# Patient Record
Sex: MALE
[De-identification: names, ages, dates, MRNs, and addresses within clinical notes are randomized per-mention and may not be internally consistent; named-entity substitution may affect disease eponyms.]

---

## 2022-07-19 PROBLEM — M54.9 BACK PAIN: Status: ACTIVE | Noted: 2022-07-19

## 2022-07-19 PROBLEM — Z00.00 ENCOUNTER FOR PREVENTIVE HEALTH EXAMINATION: Status: ACTIVE | Noted: 2022-07-19

## 2022-07-20 ENCOUNTER — APPOINTMENT (OUTPATIENT)
Dept: ORTHOPEDIC SURGERY | Facility: CLINIC | Age: 27
End: 2022-07-20

## 2022-07-20 VITALS
BODY MASS INDEX: 29.83 KG/M2 | WEIGHT: 245 LBS | HEIGHT: 76 IN | HEART RATE: 93 BPM | SYSTOLIC BLOOD PRESSURE: 149 MMHG | DIASTOLIC BLOOD PRESSURE: 82 MMHG

## 2022-07-20 DIAGNOSIS — M54.9 DORSALGIA, UNSPECIFIED: ICD-10-CM

## 2022-07-20 PROCEDURE — 99204 OFFICE O/P NEW MOD 45 MIN: CPT

## 2022-07-20 PROCEDURE — 72100 X-RAY EXAM L-S SPINE 2/3 VWS: CPT

## 2022-07-20 RX ORDER — DICLOFENAC SODIUM 75 MG/1
75 TABLET, DELAYED RELEASE ORAL
Qty: 60 | Refills: 1 | Status: ACTIVE | COMMUNITY
Start: 2022-07-20 | End: 1900-01-01

## 2022-07-20 NOTE — HISTORY OF PRESENT ILLNESS
[Worsening] : worsening [de-identified] : Pt is a  26 year old male who presents to the office today for an initial evaluation of lower back pain, which started 6 months ago. \par Pain radiates down the buttocks to the left knee. \par Numbness and tingling in anterior left thigh.\par Sitting worsens the pain.  \par Takes Naproxen BID, which helps a little. \par He did 2 sessions of PT, but he feels it worsened the pain. \par Chriropractic care helped at first but then worsened. \par No prior JEWEL. \par No trauma or injury.  \par No prior imaging. \par No fever, chills, sweats, nausea/vomiting. No bowel or bladder dysfunction, no recent weight loss or gain. No night pain. This history is in addition to the intake form that I personally reviewed.\par

## 2022-07-20 NOTE — DISCUSSION/SUMMARY
[de-identified] : Transitional segment L5-S1.\par Left lumbar radiculopathy.\par Discussed all options. \par Diclofenac\par Lumbar HEP.\par Lumbar MRI referral.\par F/U 1-2 weeks.\par All options discussed including rest,medicine,home exercise, acupuncture, Chiropractic care, physical therapy, pain management and last resort surgery. All questions were answered, all alternatives discussed and the patient is in complete agreement with the plan. Follow up appointment as instructed. If any issues arise, the patient will call or come in sooner.\par

## 2022-07-20 NOTE — ADDENDUM
[FreeTextEntry1] : This note was written by Idris Camargo on 07/20/2022 acting as scribe for Dr. Steven Cantrell M.D.\par \par I, Steven Cantrell MD, have read and attest that all the information, medical decision making and discharge instructions within are true and accurate.

## 2022-07-20 NOTE — PHYSICAL EXAM
[Normal] : Gait: normal [Kim's Sign] : negative Kim's sign [Pronator Drift] : negative pronator drift [SLR] : negative straight leg raise [de-identified] : 5 out of 5 motor strength,sensation is intact and symmetrical full range of motion flexion extension and rotation, no palpatory tenderness full range of motion of hips knees shoulders and elbows (all four extremities), no atrophy, negative straight leg raise, no pathological reflexes, no swelling, normal ambulation, no apparent distress skin intact, no palpable lymph nodes, no upper or lower extremity instability, alert and oriented x 3 and normal mood. Normal finger-to nose test.\par Pain with lumbar flexion.\par  [de-identified] : XR AP Lat Lumbar 07/20/2022 - adequate but transitional segment L5-S1- reviewed with the patient.

## 2022-08-08 ENCOUNTER — APPOINTMENT (OUTPATIENT)
Dept: MRI IMAGING | Facility: CLINIC | Age: 27
End: 2022-08-08

## 2022-08-27 ENCOUNTER — OUTPATIENT (OUTPATIENT)
Dept: OUTPATIENT SERVICES | Facility: HOSPITAL | Age: 27
LOS: 1 days | End: 2022-08-27
Payer: COMMERCIAL

## 2022-08-27 ENCOUNTER — APPOINTMENT (OUTPATIENT)
Dept: MRI IMAGING | Facility: CLINIC | Age: 27
End: 2022-08-27

## 2022-08-27 DIAGNOSIS — Z00.00 ENCOUNTER FOR GENERAL ADULT MEDICAL EXAMINATION WITHOUT ABNORMAL FINDINGS: ICD-10-CM

## 2022-08-27 PROCEDURE — 72148 MRI LUMBAR SPINE W/O DYE: CPT

## 2022-08-27 PROCEDURE — 72148 MRI LUMBAR SPINE W/O DYE: CPT | Mod: 26

## 2022-09-02 ENCOUNTER — APPOINTMENT (OUTPATIENT)
Dept: ORTHOPEDIC SURGERY | Facility: CLINIC | Age: 27
End: 2022-09-02

## 2022-09-12 ENCOUNTER — APPOINTMENT (OUTPATIENT)
Dept: ORTHOPEDIC SURGERY | Facility: CLINIC | Age: 27
End: 2022-09-12

## 2022-09-12 VITALS
HEIGHT: 76 IN | DIASTOLIC BLOOD PRESSURE: 80 MMHG | SYSTOLIC BLOOD PRESSURE: 130 MMHG | WEIGHT: 245 LBS | BODY MASS INDEX: 29.83 KG/M2 | HEART RATE: 95 BPM

## 2022-09-12 DIAGNOSIS — M51.26 OTHER INTERVERTEBRAL DISC DISPLACEMENT, LUMBAR REGION: ICD-10-CM

## 2022-09-12 DIAGNOSIS — M54.16 RADICULOPATHY, LUMBAR REGION: ICD-10-CM

## 2022-09-12 PROCEDURE — 99214 OFFICE O/P EST MOD 30 MIN: CPT

## 2022-09-12 NOTE — ADDENDUM
[FreeTextEntry1] : This note was written by Idris Camargo on 09/12/2022 acting as scribe for Dr. Steven Cantrell M.D.\par \par I, Steven Cantrell MD, have read and attest that all the information, medical decision making and discharge instructions within are true and accurate.

## 2022-09-12 NOTE — DISCUSSION/SUMMARY
[Other: ____] : in [unfilled] [de-identified] : Transitional segment L5-S1.\par Mild right L5-S1 herniation.\par Right S1 lumbar radiculopathy.\par Discussed all options. \par Diclofenac\par Lumbar HEP.\par Referred to Dr. Tyson Leija pain management right SNRB S1\par Risks of surgery include infection, dural tear, nerve root injury, reherniation, future leg pain, future back pain, retained fragment, hematoma, urinary retention, worsening leg symptoms, foot drop, anesthetic risks, blood transfusion risks, positioning pain, visceral vascular injury, deep vein thrombosis, pulmonary embolus, and death. All risks were explained not exclusive to the ones mentioned alternatives were discussed and all questions were answered the patient agrees and understands the above and is in complete agreement with the plan. \par F/U 3 weeks.\par All options discussed including rest,medicine,home exercise, acupuncture, Chiropractic care, physical therapy, pain management and last resort surgery. All questions were answered, all alternatives discussed and the patient is in complete agreement with the plan. Follow up appointment as instructed. If any issues arise, the patient will call or come in sooner.\par

## 2022-09-12 NOTE — HISTORY OF PRESENT ILLNESS
[Worsening] : worsening [de-identified] : Pt is a  26 year old male who presents to the office today for a follow up evaluation of lower back pain. MRI REVIEW\par Pain radiates down the buttocks to the left knee. \par Numbness and tingling in anterior left thigh.\par Sitting worsens the pain.  \par Takes Naproxen BID, which helps a little. \par He did 2 sessions of PT, but he feels it worsened the pain. \par Chriropractic care helped at first but then worsened. \par No prior JEWEL. \par No trauma or injury.  \par No prior imaging. \par Here of an MRI review\par No fever, chills, sweats, nausea/vomiting. No bowel or bladder dysfunction, no recent weight loss or gain. No night pain. This history is in addition to the intake form that I personally reviewed.\par

## 2022-09-12 NOTE — PHYSICAL EXAM
[Normal] : Gait: normal [Kim's Sign] : negative Kim's sign [Pronator Drift] : negative pronator drift [SLR] : negative straight leg raise [de-identified] : 5 out of 5 motor strength,sensation is intact and symmetrical full range of motion flexion extension and rotation, no palpatory tenderness full range of motion of hips knees shoulders and elbows (all four extremities), no atrophy, negative straight leg raise, no pathological reflexes, no swelling, normal ambulation, no apparent distress skin intact, no palpable lymph nodes, no upper or lower extremity instability, alert and oriented x 3 and normal mood. Normal finger-to nose test.\par Pain with lumbar flexion.\par  [de-identified] : I reviewed, interpreted and clinically correlated the following outside imaging studies, \par MR SPINE LUMBAR - ORDERED BY: WINSTON HOLLOWAY\par \par \par PROCEDURE DATE: 08/27/2022\par \par \par \par INTERPRETATION: EXAMINATION: MRI lumbar spine without contrast\par \par CLINICAL INFORMATION: Low back pain and radiculopathy\par \par TECHNIQUE: Multiplanar, multisequential MR imaging was performed.\par \par FINDINGS: There is a transitional lumbosacral vertebral body which has a broad left transverse process that is fused to the left sacral ala. This vertebral body is termed S1 for the purposes of this report. Using this nomenclature, the conus terminates at the L1 level and is normal in signal. Vertebral body heights are maintained. Alignment is normal.\par \par T12-L1 through L4-L5: No bulging or herniated intervertebral discs. No spinal canal or foraminal narrowing.\par \par L5-S1: Moderate-sized right paracentral disc herniation which slightly posteriorly displaces the descending right S1 nerve root. This is superimposed on a minimal disc bulge. Mild right foraminal narrowing. No spinal canal narrowing.\par \par S1-S2: This is a transitional segment. No spinal canal or foraminal narrowing.\par \par There is no paraspinal muscle atrophy or edema.\par \par IMPRESSION: Transitional lumbosacral vertebral body which is termed S1 for the purposes of this report.\par Moderate-sized right paracentral disc herniation at L5-S1 which slightly posteriorly displaces the descending right S1 nerve root\par \par --- End of Report ---\par \par \par \par \par \par \par HUANG LYNN MD; Attending Radiologist\par This document has been electronically signed. Sep 1 2022 4:16PM\par \par \par \par XR AP Lat Lumbar 07/20/2022 - adequate but transitional segment L5-S1- reviewed with the patient.

## 2023-03-13 ENCOUNTER — APPOINTMENT (OUTPATIENT)
Dept: ORTHOPEDIC SURGERY | Facility: CLINIC | Age: 28
End: 2023-03-13

## 2025-06-17 ENCOUNTER — INPATIENT (INPATIENT)
Facility: HOSPITAL | Age: 30
LOS: 2 days | Discharge: HOME | End: 2025-06-20
Attending: HOSPITALIST | Admitting: HOSPITALIST
Payer: MEDICAID

## 2025-06-17 VITALS
SYSTOLIC BLOOD PRESSURE: 158 MMHG | RESPIRATION RATE: 15 BRPM | OXYGEN SATURATION: 100 % | TEMPERATURE: 98 F | HEART RATE: 109 BPM | DIASTOLIC BLOOD PRESSURE: 110 MMHG

## 2025-06-17 LAB
ALBUMIN SERPL ELPH-MCNC: 4.7 G/DL — SIGNIFICANT CHANGE UP (ref 3.3–5)
ALP SERPL-CCNC: 87 U/L — SIGNIFICANT CHANGE UP (ref 40–120)
ALT FLD-CCNC: 62 U/L — HIGH (ref 4–41)
ANION GAP SERPL CALC-SCNC: 17 MMOL/L — HIGH (ref 7–14)
APAP SERPL-MCNC: <10 UG/ML — LOW (ref 15–25)
AST SERPL-CCNC: 34 U/L — SIGNIFICANT CHANGE UP (ref 4–40)
BASOPHILS # BLD AUTO: 0.02 K/UL — SIGNIFICANT CHANGE UP (ref 0–0.2)
BASOPHILS NFR BLD AUTO: 0.3 % — SIGNIFICANT CHANGE UP (ref 0–2)
BILIRUB SERPL-MCNC: 0.4 MG/DL — SIGNIFICANT CHANGE UP (ref 0.2–1.2)
BUN SERPL-MCNC: 11 MG/DL — SIGNIFICANT CHANGE UP (ref 7–23)
CALCIUM SERPL-MCNC: 8.9 MG/DL — SIGNIFICANT CHANGE UP (ref 8.4–10.5)
CHLORIDE SERPL-SCNC: 104 MMOL/L — SIGNIFICANT CHANGE UP (ref 98–107)
CO2 SERPL-SCNC: 22 MMOL/L — SIGNIFICANT CHANGE UP (ref 22–31)
CREAT SERPL-MCNC: 0.97 MG/DL — SIGNIFICANT CHANGE UP (ref 0.5–1.3)
EGFR: 108 ML/MIN/1.73M2 — SIGNIFICANT CHANGE UP
EGFR: 108 ML/MIN/1.73M2 — SIGNIFICANT CHANGE UP
EOSINOPHIL # BLD AUTO: 0.04 K/UL — SIGNIFICANT CHANGE UP (ref 0–0.5)
EOSINOPHIL NFR BLD AUTO: 0.5 % — SIGNIFICANT CHANGE UP (ref 0–6)
ETHANOL SERPL-MCNC: 346 MG/DL — HIGH
GLUCOSE SERPL-MCNC: 97 MG/DL — SIGNIFICANT CHANGE UP (ref 70–99)
HCT VFR BLD CALC: 53.1 % — HIGH (ref 39–50)
HGB BLD-MCNC: 17.4 G/DL — HIGH (ref 13–17)
IMM GRANULOCYTES # BLD AUTO: 0.02 K/UL — SIGNIFICANT CHANGE UP (ref 0–0.07)
IMM GRANULOCYTES NFR BLD AUTO: 0.3 % — SIGNIFICANT CHANGE UP (ref 0–0.9)
LIDOCAIN IGE QN: 91 U/L — HIGH (ref 7–60)
LYMPHOCYTES # BLD AUTO: 3.87 K/UL — HIGH (ref 1–3.3)
LYMPHOCYTES NFR BLD AUTO: 51.1 % — HIGH (ref 13–44)
MAGNESIUM SERPL-MCNC: 2.1 MG/DL — SIGNIFICANT CHANGE UP (ref 1.6–2.6)
MCHC RBC-ENTMCNC: 26.6 PG — LOW (ref 27–34)
MCHC RBC-ENTMCNC: 32.8 G/DL — SIGNIFICANT CHANGE UP (ref 32–36)
MCV RBC AUTO: 81.1 FL — SIGNIFICANT CHANGE UP (ref 80–100)
MONOCYTES # BLD AUTO: 0.34 K/UL — SIGNIFICANT CHANGE UP (ref 0–0.9)
MONOCYTES NFR BLD AUTO: 4.5 % — SIGNIFICANT CHANGE UP (ref 2–14)
NEUTROPHILS # BLD AUTO: 3.28 K/UL — SIGNIFICANT CHANGE UP (ref 1.8–7.4)
NEUTROPHILS NFR BLD AUTO: 43.3 % — SIGNIFICANT CHANGE UP (ref 43–77)
NRBC # BLD AUTO: 0 K/UL — SIGNIFICANT CHANGE UP (ref 0–0)
NRBC # FLD: 0 K/UL — SIGNIFICANT CHANGE UP (ref 0–0)
NRBC BLD AUTO-RTO: 0 /100 WBCS — SIGNIFICANT CHANGE UP (ref 0–0)
PLATELET # BLD AUTO: 233 K/UL — SIGNIFICANT CHANGE UP (ref 150–400)
PMV BLD: 10.3 FL — SIGNIFICANT CHANGE UP (ref 7–13)
POTASSIUM SERPL-MCNC: 4.5 MMOL/L — SIGNIFICANT CHANGE UP (ref 3.5–5.3)
POTASSIUM SERPL-SCNC: 4.5 MMOL/L — SIGNIFICANT CHANGE UP (ref 3.5–5.3)
PROT SERPL-MCNC: 8.2 G/DL — SIGNIFICANT CHANGE UP (ref 6–8.3)
RBC # BLD: 6.55 M/UL — HIGH (ref 4.2–5.8)
RBC # FLD: 13.9 % — SIGNIFICANT CHANGE UP (ref 10.3–14.5)
SALICYLATES SERPL-MCNC: <0.3 MG/DL — LOW (ref 15–30)
SODIUM SERPL-SCNC: 143 MMOL/L — SIGNIFICANT CHANGE UP (ref 135–145)
TOXICOLOGY SCREEN, DRUGS OF ABUSE, SERUM RESULT: SIGNIFICANT CHANGE UP
WBC # BLD: 7.57 K/UL — SIGNIFICANT CHANGE UP (ref 3.8–10.5)
WBC # FLD AUTO: 7.57 K/UL — SIGNIFICANT CHANGE UP (ref 3.8–10.5)

## 2025-06-17 PROCEDURE — 99285 EMERGENCY DEPT VISIT HI MDM: CPT

## 2025-06-17 RX ADMIN — Medication 1000 MILLILITER(S): at 23:05

## 2025-06-17 RX ADMIN — Medication 20 MILLIGRAM(S): at 23:05

## 2025-06-17 NOTE — ED PROVIDER NOTE - CLINICAL SUMMARY MEDICAL DECISION MAKING FREE TEXT BOX
patient is a 29-year-old male, history of anxiety, depression managed on escitalopram, alcohol abuse brought in by dad for acute intoxication.  As per patient he has been drinking about 20 L of alcohol per week over the last year states that its coping mechanisms to deal with his anxiety and depression. On presentation patient with alcohol on breath, vitals stable, calm and cooperative, no tremors or acute signs of alcohol withdrawal.  Plan for routine labs throughout electrolyte derangements.  Plan for social work to establish possible detox.

## 2025-06-17 NOTE — ED ADULT TRIAGE NOTE - CHIEF COMPLAINT QUOTE
C/O intox endorsing drinking "15L of vodka" today. No complaints of chest pain, headache, nausea, dizziness, vomiting  SOB, fever, chills verbalized. no phx

## 2025-06-17 NOTE — ED PROVIDER NOTE - NSFOLLOWUPINSTRUCTIONS_ED_ALL_ED_FT
You were provided educational materials on guidelines and substance use and health. You were provided a referral to treatment for patient at [facility].   [address] [address]  [phone]    Your heart rate was also elevated. Please follow up with cardiology for further management.

## 2025-06-17 NOTE — ED PROVIDER NOTE - CARE PLAN
1 Principal Discharge DX:	Alcohol intoxication   Principal Discharge DX:	Alcohol dependence with withdrawal

## 2025-06-17 NOTE — ED PROVIDER NOTE - PROGRESS NOTE DETAILS
Aniyah Wahl DO (PGY-2): Patient signed out to me at shift change. Patient awake and alert, clinically sober. Father arrived to bring patient home, however -120s. No signs of alcohol withdrawal on reassessment. Will obtain ECG, give another liter of fluids and observe. Jaxson Chacon DO (ED Attending):     Patient was signed out to me pending sober reevaluation.    Patient blood work shows evidence of dehydration with hemoconcentration and RBC hemoglobin hematocrit.  No electrode abnormalities.  Mild elevation in lipase at 91.  Alcohol up at 346.    Patient currently clinically sober.  He is electrocardioverted at 110.  EKG obtained which shows sinus tachycardia 108 bpm, normal axis, normal intervals, T wave inversion seen in leads III and aVF.  No prior EKGs to compare to.    However per the patient he states that he knows that these T wave inversions.  Patient saw cardiologist back in February when she had a normal stress and echo.  They were "unable to find a cause".    He has no chest pain shortness of breath lightheadedness fatigue with no suspect ACS.    Will consult SBIRT Aniyah Wahl,  (PGY-2): HR improved after IVF. SBIRT evaluated at bedside and provided patient resources. Enrolled in [facility] ([address] [id number]). patient ambulatory in ED, stable for discharge home. Strict return precautions given. Aniyah Wahl DO (PGY-2): Patient's HR now intermittently between . CIWA protocol ordered, 50mg Librium given. Will admit for cardiac eval and alcohol withdrawal. Patient agreeable with plan.

## 2025-06-17 NOTE — ED PROVIDER NOTE - OBJECTIVE STATEMENT
patient is a 29-year-old male, history of anxiety, depression managed on escitalopram, alcohol abuse brought in by dad for acute intoxication.  As per patient he has been drinking about 20 L of alcohol per week over the last year states that its coping mechanisms to deal with his anxiety and depression.  There is no associated suicidal/homicidal ideation chest/abdominal pain, nausea, vomiting.  Denying any history of alcohol withdrawals, pancreatitis.  Patient states that he is interested in detox program.

## 2025-06-17 NOTE — ED ADULT NURSE NOTE - NSFALLRISKINTERV_ED_ALL_ED
Assistance OOB with selected safe patient handling equipment if applicable/Assistance with ambulation/Communicate fall risk and risk factors to all staff, patient, and family/Monitor gait and stability/Monitor for mental status changes and reorient to person, place, and time, as needed/Provide patient with walking aids/Provide visual cue: yellow wristband, yellow gown, etc/Reinforce activity limits and safety measures with patient and family/Toileting schedule using arm’s reach rule for commode and bathroom/Use of alarms - bed, stretcher, chair and/or video monitoring/Call bell, personal items and telephone in reach/Instruct patient to call for assistance before getting out of bed/chair/stretcher/Non-slip footwear applied when patient is off stretcher/[place] to call system/Physically safe environment - no spills, clutter or unnecessary equipment/Purposeful Proactive Rounding/Room/bathroom lighting operational, light cord in reach

## 2025-06-17 NOTE — ED PROVIDER NOTE - ATTENDING APP SHARED VISIT CONTRIBUTION OF CARE
Brief HPI:  29-year-old male, history of anxiety, depression managed on escitalopram, alcohol abuse with withdrawal (reports previous etoh hallucinosis in past) presents with father with concern for alcohol use.  Patient states he drank several liters of hard liquor today.  He is somewhat tangential, however states he came to the hospital to seek assistance with drinking problem.  Father corroborates this.  Denies other drug use.  Denies SI/HI/AH/VH.  Does not endorse other complaints.      Vitals:   Reviewed    Exam:    GEN:  Non-toxic appearing, non-distressed, speaking full sentences, slurred speech, non-diaphoretic, AAOx3  HEENT:  NCAT, neck supple, EOMI, PERRLA, sclera anicteric, no conjunctival pallor or injection, no stridor, normal voice, no tonsillar exudate, uvula midline  CV:  regular rhythm and rate, s1/s2 audible, no murmurs, rubs or gallops, peripheral pulses 2+ and symmetric  PULM:  non-labored respirations, lungs clear to auscultation bilaterally, no wheezes, crackles or rales  ABD:  non distended, non-tender, no rebound, no guarding, negative Thomas's sign, bowel sounds normal, no cvat  MSK:  no gross deformity, non-tender extremities and joints, range of motion grossly normal appearing, no extremity edema, extremities warm and well perfused   NEURO:  AAOx3, CN II-XII intact, motor 5/5 in upper and lower extremities bilaterally, sensation grossly intact in extremities and trunk, unable to assess gait at time of interview   SKIN:  warm, dry, no rash or vesicles     A/P:  29-year-old male, history of anxiety, depression managed on escitalopram, alcohol abuse with withdrawal (reports previous etoh hallucinosis in past) presents with father with concern for alcohol use.  VSS.  Exam intoxicated appearing, consistent with etoh use as patient stated.  Denies SI/HI/AH/VH.  Does not require emergent  consult at this time . Will provide supportive care and observe to sobriety.  Will contact social work regarding substance abuse resources.  Disposition pending.

## 2025-06-17 NOTE — ED ADULT NURSE NOTE - OBJECTIVE STATEMENT
Pt A&Ox4 ambulatory at baseline, PMH ETOH abuse, presenting to the ED (RM 24A) c/o intoxication. Pt brought in by father, stating patient has been drinking excessively for a week, states approx "15L of vodka". As per patient, states he has been going through stress. Endorses recently had a bone marrow biopsy for r/o Leukemia.. Pt at this moment denies any medical complaints. Denies chest pain, SOB, dizziness, lightheadedness, n/v/d, HA. blurry vision. Pt responsive to verbal stimulus, noted to be intoxicated. Respirations are even and unlabored, NAD, calm and cooperative. Safety precautions implemented as per protocol, awaiting further MD orders, plan of care ongoing. Pt A&Ox4 ambulatory at baseline, PMH ETOH abuse, presenting to the ED (RM 24A) c/o intoxication. Pt brought in by father, stating patient has been drinking excessively for a week, states approx "15L of vodka". As per patient, states he has been going through stress. Endorses recently had a bone marrow biopsy for r/o Leukemia. Last drink approx 2 hour ago. Pt at this moment denies any medical complaints. Denies chest pain, SOB, dizziness, lightheadedness, n/v/d, HA. blurry vision. Pt responsive to verbal stimulus, noted to be intoxicated. Respirations are even and unlabored, NAD, calm and cooperative. Safety precautions implemented as per protocol, awaiting further MD orders, plan of care ongoing.

## 2025-06-17 NOTE — ED PROVIDER NOTE - CONSTITUTIONAL, MLM
+ etoh on breath. calm cooperative. no tremors. Well appearing, awake, alert, oriented to person, place, time/situation and in no apparent distress. normal...

## 2025-06-18 DIAGNOSIS — Z29.9 ENCOUNTER FOR PROPHYLACTIC MEASURES, UNSPECIFIED: ICD-10-CM

## 2025-06-18 DIAGNOSIS — F10.239 ALCOHOL DEPENDENCE WITH WITHDRAWAL, UNSPECIFIED: ICD-10-CM

## 2025-06-18 DIAGNOSIS — R10.13 EPIGASTRIC PAIN: ICD-10-CM

## 2025-06-18 DIAGNOSIS — Z91.89 OTHER SPECIFIED PERSONAL RISK FACTORS, NOT ELSEWHERE CLASSIFIED: ICD-10-CM

## 2025-06-18 DIAGNOSIS — D75.1 SECONDARY POLYCYTHEMIA: ICD-10-CM

## 2025-06-18 DIAGNOSIS — F41.8 OTHER SPECIFIED ANXIETY DISORDERS: ICD-10-CM

## 2025-06-18 LAB
ADD ON TEST-SPECIMEN IN LAB: SIGNIFICANT CHANGE UP
TSH SERPL-MCNC: 0.6 UIU/ML — SIGNIFICANT CHANGE UP (ref 0.27–4.2)

## 2025-06-18 PROCEDURE — 99223 1ST HOSP IP/OBS HIGH 75: CPT

## 2025-06-18 RX ORDER — MELATONIN 5 MG
3 TABLET ORAL AT BEDTIME
Refills: 0 | Status: DISCONTINUED | OUTPATIENT
Start: 2025-06-18 | End: 2025-06-20

## 2025-06-18 RX ORDER — LORAZEPAM 4 MG/ML
2 VIAL (ML) INJECTION
Refills: 0 | Status: DISCONTINUED | OUTPATIENT
Start: 2025-06-18 | End: 2025-06-19

## 2025-06-18 RX ORDER — ESCITALOPRAM OXALATE 20 MG/1
1 TABLET ORAL
Refills: 0 | DISCHARGE

## 2025-06-18 RX ORDER — FOLIC ACID 1 MG/1
1 TABLET ORAL
Refills: 0 | DISCHARGE

## 2025-06-18 RX ORDER — ESCITALOPRAM OXALATE 20 MG/1
20 TABLET ORAL DAILY
Refills: 0 | Status: DISCONTINUED | OUTPATIENT
Start: 2025-06-18 | End: 2025-06-20

## 2025-06-18 RX ORDER — ONDANSETRON HCL/PF 4 MG/2 ML
4 VIAL (ML) INJECTION ONCE
Refills: 0 | Status: COMPLETED | OUTPATIENT
Start: 2025-06-18 | End: 2025-06-18

## 2025-06-18 RX ORDER — ACETAMINOPHEN 500 MG/5ML
650 LIQUID (ML) ORAL EVERY 6 HOURS
Refills: 0 | Status: DISCONTINUED | OUTPATIENT
Start: 2025-06-18 | End: 2025-06-20

## 2025-06-18 RX ORDER — FOLIC ACID 1 MG/1
1 TABLET ORAL DAILY
Refills: 0 | Status: DISCONTINUED | OUTPATIENT
Start: 2025-06-18 | End: 2025-06-20

## 2025-06-18 RX ORDER — CYANOCOBALAMIN 1000 UG/ML
1000 INJECTION INTRAMUSCULAR; SUBCUTANEOUS DAILY
Refills: 0 | Status: DISCONTINUED | OUTPATIENT
Start: 2025-06-18 | End: 2025-06-20

## 2025-06-18 RX ORDER — TRAZODONE HCL 100 MG
1 TABLET ORAL
Refills: 0 | DISCHARGE

## 2025-06-18 RX ORDER — LORAZEPAM 4 MG/ML
4 VIAL (ML) INJECTION
Refills: 0 | Status: DISCONTINUED | OUTPATIENT
Start: 2025-06-18 | End: 2025-06-19

## 2025-06-18 RX ORDER — CHLORDIAZEPOXIDE HCL 10 MG
50 CAPSULE ORAL ONCE
Refills: 0 | Status: DISCONTINUED | OUTPATIENT
Start: 2025-06-18 | End: 2025-06-18

## 2025-06-18 RX ORDER — ERGOCALCIFEROL 1.25 MG/1
0 CAPSULE ORAL
Qty: 0 | Refills: 3 | DISCHARGE

## 2025-06-18 RX ORDER — SODIUM CHLORIDE 9 G/1000ML
1000 INJECTION, SOLUTION INTRAVENOUS
Refills: 0 | Status: DISCONTINUED | OUTPATIENT
Start: 2025-06-18 | End: 2025-06-20

## 2025-06-18 RX ORDER — B1/B2/B3/B5/B6/B12/VIT C/FOLIC 500-0.5 MG
1 TABLET ORAL DAILY
Refills: 0 | Status: DISCONTINUED | OUTPATIENT
Start: 2025-06-18 | End: 2025-06-20

## 2025-06-18 RX ORDER — CYANOCOBALAMIN 1000 UG/ML
1 INJECTION INTRAMUSCULAR; SUBCUTANEOUS
Refills: 0 | DISCHARGE

## 2025-06-18 RX ORDER — ONDANSETRON HCL/PF 4 MG/2 ML
4 VIAL (ML) INJECTION EVERY 8 HOURS
Refills: 0 | Status: DISCONTINUED | OUTPATIENT
Start: 2025-06-18 | End: 2025-06-20

## 2025-06-18 RX ORDER — TRAZODONE HCL 100 MG
50 TABLET ORAL AT BEDTIME
Refills: 0 | Status: DISCONTINUED | OUTPATIENT
Start: 2025-06-18 | End: 2025-06-20

## 2025-06-18 RX ADMIN — Medication 4 MILLIGRAM(S): at 12:41

## 2025-06-18 RX ADMIN — Medication 50 MILLIGRAM(S): at 14:13

## 2025-06-18 RX ADMIN — Medication 50 MILLIGRAM(S): at 22:10

## 2025-06-18 RX ADMIN — Medication 20 MILLIGRAM(S): at 18:14

## 2025-06-18 RX ADMIN — Medication 1000 MILLILITER(S): at 12:19

## 2025-06-18 RX ADMIN — Medication 1000 MILLILITER(S): at 09:24

## 2025-06-18 RX ADMIN — ESCITALOPRAM OXALATE 20 MILLIGRAM(S): 20 TABLET ORAL at 18:14

## 2025-06-18 RX ADMIN — SODIUM CHLORIDE 60 MILLILITER(S): 9 INJECTION, SOLUTION INTRAVENOUS at 17:09

## 2025-06-18 NOTE — H&P ADULT - NSHPLABSRESULTS_GEN_ALL_CORE
LABS:                         17.4   7.57  )-----------( 233      ( 17 Jun 2025 23:04 )             53.1     06-17    143  |  104  |  11  ----------------------------<  97  4.5   |  22  |  0.97    Ca    8.9      17 Jun 2025 23:04  Mg     2.10     06-17    TPro  8.2  /  Alb  4.7  /  TBili  0.4  /  DBili  x   /  AST  34  /  ALT  62[H]  /  AlkPhos  87  06-17      Urinalysis Basic - ( 17 Jun 2025 23:04 )    Color: x / Appearance: x / SG: x / pH: x  Gluc: 97 mg/dL / Ketone: x  / Bili: x / Urobili: x   Blood: x / Protein: x / Nitrite: x   Leuk Esterase: x / RBC: x / WBC x   Sq Epi: x / Non Sq Epi: x / Bacteria: x                    RADIOLOGY, EKG & ADDITIONAL TESTS: Reviewed.

## 2025-06-18 NOTE — H&P ADULT - ASSESSMENT
Mr. Oleary is a 30 y/o F with PMH of Anxiety/Depression (on lexapro/trazadone), Hx of daily alcohol use, Erythrocytosis (Hb 17-18 typically, s/p phlebotomy x2 in past, BMB in past as well - inconclusive) who presents from home with his father for acute intoxication. Pt reports drinking 1-2L of alcohol daily with last drink on 6/17 in AM prior to presentation and +mild epigastric pain and admitted for further management.

## 2025-06-18 NOTE — H&P ADULT - PROBLEM SELECTOR PLAN 4
-reports mood disorder with current medication regimen of lexapro and trazadone not fully helping him as well. Sees a psychiatrist and therapist on routine basis  -current regimen: lexapro 20mg daily (recently increased) and trazadone 100mg qhs (has been taking 50mg)  -c/w lexapro 20mg and trazadone 50mg qhs   -psych consult in AM

## 2025-06-18 NOTE — H&P ADULT - NSHPPHYSICALEXAM_GEN_ALL_CORE
Vitals:  Vital Signs Last 24 Hrs  T(C): 37.4 (18 Jun 2025 16:48), Max: 37.4 (18 Jun 2025 16:48)  T(F): 99.4 (18 Jun 2025 16:48), Max: 99.4 (18 Jun 2025 16:48)  HR: 106 (18 Jun 2025 16:48) (94 - 117)  BP: 159/74 (18 Jun 2025 16:48) (102/72 - 159/74)  BP(mean): --  RR: 18 (18 Jun 2025 16:48) (15 - 19)  SpO2: 99% (18 Jun 2025 16:48) (97% - 100%)    Parameters below as of 18 Jun 2025 16:48  Patient On (Oxygen Delivery Method): room air      CAPILLARY BLOOD GLUCOSE      PHYSICAL EXAM:  GENERAL: NAD, lying in bed comfortably, on RA, no tachypnea/accessory mm use  HEENT: NC/AT, EOMI, PERRL, MMM, anicteric sclera   NECK: Supple, no cervical LAD  CHEST/LUNG: CTAB, no increased WOB  HEART: tachycardic, regular, no m/r/g  ABDOMEN: soft, mild epigastric pain to palpation, ND, +BS, no bladder tenderness; neg jane's sign   EXTREMITIES:  2+ peripheral pulses, no edema  NERVOUS SYSTEM:  A&Ox4, CN II-XII intact, face symmetric, speech fluent, wears glasses,   MSK: FROM all 4 extremities, full and equal strength b/l with sensation intact b/l as well; no tremors/shakes/tongue fasciculations   SKIN: warm, no flushing/rashes, no jaundice of skin

## 2025-06-18 NOTE — H&P ADULT - PROBLEM SELECTOR PLAN 1
-reports drinking 1-2L of alcohol daily, last drink on 6/17 prior to presentation to the hospitalization for intoxication. . Serum tox neg. NO prior hx of alcohol withdrawal admissions in hospitalized setting, no hx of seizures, s/p po librium 50mg x1 in ED as well  -CIWA protocol with symptom trigger IV prn prn ativan (thus far sober and has not scored on CIWA)   -zofran prn for nausea  -IV thiamine high dose  -folic acid, mvi, b12, pepcid   -SBIRT eval   -s/p IVF x3 L, mIVF for now at 60cc/hr for 24 hours and reassess afterwards  -check hba1c, tsh, flp, Check urine tox.     #Sinus tach  -likely in setting of above, had prior cardiac workup in Feb w/normal stress and echo for T wave changes on EKG. Reports no cp/sob/dyspnea on exertion either.   -tele monitoring

## 2025-06-18 NOTE — H&P ADULT - PROBLEM SELECTOR PLAN 5
- Diet: FLD for now, advance as tolerated   - DVT PPx: Ambulatory, low padua score   - Dispo: Pending clinical course  Meds confirmed with pt bedside.    Plan of care reviewed with pt in detail who verbalized understanding and expressed agreement.

## 2025-06-18 NOTE — H&P ADULT - HISTORY OF PRESENT ILLNESS
Mr. Oleary is a 28 y/o F with PMH of Anxiety/Depression (on lexapro/trazadone), Hx of daily alcohol use, Erythrocytosis (Hb 17-18 typically, s/p phlebotomy x2 in past, BMB in past as well - inconclusive) who presents from home with his father for acute intoxication. Pt reports drinking 1-2L of alcohol daily with last drink on 6/17 in AM prior to presentation, reports no prior hospitalizations for alcohol use disorder. He has tried naltrexone outpatient but he did not tolerate it well and dc'ed it after 2-3 days on it, was also prescribed diazepam on OP basis but has not taken it at all as he was worried about its interactions with his current psych meds. Feels his current psych meds  are not helping him as much, he did recently see his Op psychiatrist and was increased to lexapro 20mg daily and trazadone 100mg (but has been taking 50mg) as he feels the current regimen not helping. Pt is motivated to get his mental health better and not be reliant on alcohol as well. Says family has been worried for him given his ongoing and daily alcohol use and also want him to get the help he needs.   Seen in ED, reports no cp/sob/dyspnea, does report a bit of dizziness when he changes position, no gait issues, no room spinning sensation, no vision changes, headaches, tinnitus, recent travels, sick contacts, dyspnea on exertion. Is reporting mild epigastric band like abd pain as well, has been tolerating water/juices well otherwise. CIWA score 0, has not scored on it thus far. No active suicidal/homicidal ideation, no previous hx of detox program.     ED course: afebrile, HR in 90s-100s, SBP in 140s-150s/70s, on RA at 97-98%, on CIWA. Serum tox noted for Bal 346, Lipase 91, Na 143, Cr 0.9, ALT 62, AST wnl, Hb 17.4, WBC wnl, Plt 233K. s/p IVF bolus 1L x3, PO Librium 50mg x1, IV Zofran 4mg x1, IV Pepcid 20mg x1. Admitted to medicine for further management.

## 2025-06-18 NOTE — SBIRT NOTE ADULT - NSSBIRTALCACTIVEREFTXDET_GEN_A_CORE
Screening results were reviewed with the patient. Patient was provided educational materials on low-risk guidelines and substance use and health. Motivation and goals were discussed. Referral to treatment for patient at [facility] is in progress. Patient provided with [facility] SBIRT information. Patient enrolled in [employer].     Screening results were reviewed with the patient. Patient was provided educational materials on low-risk guidelines and substance use and health. Motivation and goals were discussed. Patient provided with a referral to substance use treatment at [facility]. Patient enrolled in [employer].     Screening results were reviewed with the patient. Patient was provided educational materials on low-risk guidelines and substance use and health. Motivation and goals were discussed. Referral to treatment for patient at [facility] is in progress.   Patient enrolled in [employer].

## 2025-06-18 NOTE — SBIRT NOTE ADULT - NSSBIRTALCACTION/INTER_GEN_A_CORE
MATT STORY is a 39 yo  now POD#1 s/p repeat  section @ [id number] and bilateral salpingectomy. Male infant     S:    Patient seen and examined at bedside this AM.  She is doing well, has no complaints. Pain controlled with medication   She is ambulating to the restroom and back.   Tolerating PO, and urinating spontaneously.   + flatus/-BM   Denies any headaches, fevers or chills.     O:    T(C): 36.4 (19 @ 04:00), Max: 36.9 (19 @ 12:51)  HR: 79 (19 @ 04:00) (75 - 93)  BP: 104/69 (19 @ 04:00) (85/63 - 121/75)  RR: 18 (19 @ 04:00) (16 - 20)  SpO2: 98% (19 @ 04:00) (92% - 100%)      Breast: Nontender, not engorged   Abdomen:  Soft, appropriately-tender, non-distended, +bowel sounds.  Uterus:  Fundus firm below umbilicus  Incision:  Steri strips in place. Clean/dry/intact  VE:  +lochia  Ext:  Non-tender, no edema                           10.9   9.29  )-----------( 161      ( 2019 08:16 )             33.2 Active referral to treatment

## 2025-06-18 NOTE — H&P ADULT - PROBLEM SELECTOR PLAN 3
-Baseline Hb 17-18, has been following with heme OP s/p BMB recently but was inconclusive, has also gotten phlebotomy x2, last session ~1 month prior. Is pending sleep studies. Non smoker.   -monitor Hb closely    #Dizziness  -reports some dizziness with changing on position, no gait issues, no room spinning, no vision changes. Neuro exam at present overall unremarkable   -check orthostatics  -monitor closely, consider further imaging/testing based on clinical course

## 2025-06-18 NOTE — ED ADULT NURSE REASSESSMENT NOTE - NS ED NURSE REASSESS COMMENT FT1
Pt appears to be alert and awake, hyperverbal and noted to be appear intoxicated. Respirations are even and unlabored. NAD, no complaints at this moment. Pt placed on CM, VS noted. Safety precautions implemented as per protocol, awaiting further MD orders, plan of care ongoing.
Pt appears to be resting comfortably, NAD, respirations are even and unlabored, no complaints at this moment. VS noted. CM in progress. Safety precautions implemented as per protocol, awaiting further MD orders, plan of care ongoing.
Report received from PADMINI Juárez. Pt is A&Ox4, appears comfortable resting in stretcher. Pt offers no complaints at this time. breathing is even and unlabored. NSR on cardiac monitor. right AC 20G IV WNL. Stretcher set in lowest position, call bell within reach, safety maintained.
Pt is A&Ox4, appears comfortable in stretcher. Pt offers no complaints or requests at this time. breathing is even and unlabored. cardiac monitoring maintained. Stretcher set in lowest position, safety maintained.

## 2025-06-18 NOTE — H&P ADULT - PROBLEM SELECTOR PLAN 2
-reported with mild epigastric pain, bandlike as well. s/p IV pepcid x1 in Ed as well. Lipase elevated at 91. No prior hx of pancreatitis. LFTs wnl, neg jane's sign on exam  -ddx: gastritis vs mild pancreatitis. check FLP.   -IVF   -PPI BID  -FLD for now, advance as tolerated  -zofran prn for nausea

## 2025-06-19 LAB
A1C WITH ESTIMATED AVERAGE GLUCOSE RESULT: 5.3 % — SIGNIFICANT CHANGE UP (ref 4–5.6)
ALBUMIN SERPL ELPH-MCNC: 3.8 G/DL — SIGNIFICANT CHANGE UP (ref 3.3–5)
ALP SERPL-CCNC: 75 U/L — SIGNIFICANT CHANGE UP (ref 40–120)
ALT FLD-CCNC: 42 U/L — HIGH (ref 4–41)
ANION GAP SERPL CALC-SCNC: 13 MMOL/L — SIGNIFICANT CHANGE UP (ref 7–14)
AST SERPL-CCNC: 25 U/L — SIGNIFICANT CHANGE UP (ref 4–40)
BILIRUB SERPL-MCNC: 1.2 MG/DL — SIGNIFICANT CHANGE UP (ref 0.2–1.2)
BUN SERPL-MCNC: 14 MG/DL — SIGNIFICANT CHANGE UP (ref 7–23)
CALCIUM SERPL-MCNC: 8.4 MG/DL — SIGNIFICANT CHANGE UP (ref 8.4–10.5)
CHLORIDE SERPL-SCNC: 103 MMOL/L — SIGNIFICANT CHANGE UP (ref 98–107)
CHOLEST SERPL-MCNC: 171 MG/DL — SIGNIFICANT CHANGE UP
CO2 SERPL-SCNC: 22 MMOL/L — SIGNIFICANT CHANGE UP (ref 22–31)
CREAT SERPL-MCNC: 0.82 MG/DL — SIGNIFICANT CHANGE UP (ref 0.5–1.3)
EGFR: 122 ML/MIN/1.73M2 — SIGNIFICANT CHANGE UP
EGFR: 122 ML/MIN/1.73M2 — SIGNIFICANT CHANGE UP
ESTIMATED AVERAGE GLUCOSE: 105 — SIGNIFICANT CHANGE UP
GLUCOSE SERPL-MCNC: 86 MG/DL — SIGNIFICANT CHANGE UP (ref 70–99)
HCT VFR BLD CALC: 46 % — SIGNIFICANT CHANGE UP (ref 39–50)
HDLC SERPL-MCNC: 32 MG/DL — LOW
HGB BLD-MCNC: 15.3 G/DL — SIGNIFICANT CHANGE UP (ref 13–17)
LDLC SERPL-MCNC: 100 MG/DL — HIGH
LIPID PNL WITH DIRECT LDL SERPL: 100 MG/DL — HIGH
MCHC RBC-ENTMCNC: 27.4 PG — SIGNIFICANT CHANGE UP (ref 27–34)
MCHC RBC-ENTMCNC: 33.3 G/DL — SIGNIFICANT CHANGE UP (ref 32–36)
MCV RBC AUTO: 82.4 FL — SIGNIFICANT CHANGE UP (ref 80–100)
NONHDLC SERPL-MCNC: 139 MG/DL — HIGH
NRBC # BLD AUTO: 0 K/UL — SIGNIFICANT CHANGE UP (ref 0–0)
NRBC # FLD: 0 K/UL — SIGNIFICANT CHANGE UP (ref 0–0)
NRBC BLD AUTO-RTO: 0 /100 WBCS — SIGNIFICANT CHANGE UP (ref 0–0)
PLATELET # BLD AUTO: 176 K/UL — SIGNIFICANT CHANGE UP (ref 150–400)
PMV BLD: 10.9 FL — SIGNIFICANT CHANGE UP (ref 7–13)
POTASSIUM SERPL-MCNC: 3.7 MMOL/L — SIGNIFICANT CHANGE UP (ref 3.5–5.3)
POTASSIUM SERPL-SCNC: 3.7 MMOL/L — SIGNIFICANT CHANGE UP (ref 3.5–5.3)
PROT SERPL-MCNC: 6.7 G/DL — SIGNIFICANT CHANGE UP (ref 6–8.3)
RBC # BLD: 5.58 M/UL — SIGNIFICANT CHANGE UP (ref 4.2–5.8)
RBC # FLD: 13.1 % — SIGNIFICANT CHANGE UP (ref 10.3–14.5)
SODIUM SERPL-SCNC: 138 MMOL/L — SIGNIFICANT CHANGE UP (ref 135–145)
TRIGL SERPL-MCNC: 229 MG/DL — HIGH
VIT B12 SERPL-MCNC: 313 PG/ML — SIGNIFICANT CHANGE UP (ref 200–900)
WBC # BLD: 5.71 K/UL — SIGNIFICANT CHANGE UP (ref 3.8–10.5)
WBC # FLD AUTO: 5.71 K/UL — SIGNIFICANT CHANGE UP (ref 3.8–10.5)

## 2025-06-19 PROCEDURE — 99232 SBSQ HOSP IP/OBS MODERATE 35: CPT

## 2025-06-19 RX ORDER — LORAZEPAM 4 MG/ML
2 VIAL (ML) INJECTION
Refills: 0 | Status: DISCONTINUED | OUTPATIENT
Start: 2025-06-19 | End: 2025-06-20

## 2025-06-19 RX ADMIN — SODIUM CHLORIDE 60 MILLILITER(S): 9 INJECTION, SOLUTION INTRAVENOUS at 06:42

## 2025-06-19 RX ADMIN — Medication 105 MILLIGRAM(S): at 00:17

## 2025-06-19 RX ADMIN — Medication 105 MILLIGRAM(S): at 06:43

## 2025-06-19 RX ADMIN — Medication 650 MILLIGRAM(S): at 07:11

## 2025-06-19 RX ADMIN — Medication 105 MILLIGRAM(S): at 22:12

## 2025-06-19 RX ADMIN — Medication 20 MILLIGRAM(S): at 07:20

## 2025-06-19 RX ADMIN — CYANOCOBALAMIN 1000 MICROGRAM(S): 1000 INJECTION INTRAMUSCULAR; SUBCUTANEOUS at 13:44

## 2025-06-19 RX ADMIN — Medication 20 MILLIGRAM(S): at 17:21

## 2025-06-19 RX ADMIN — Medication 105 MILLIGRAM(S): at 13:45

## 2025-06-19 RX ADMIN — FOLIC ACID 1 MILLIGRAM(S): 1 TABLET ORAL at 13:44

## 2025-06-19 RX ADMIN — Medication 50 MILLIGRAM(S): at 22:13

## 2025-06-19 RX ADMIN — Medication 1 TABLET(S): at 13:44

## 2025-06-19 RX ADMIN — ESCITALOPRAM OXALATE 20 MILLIGRAM(S): 20 TABLET ORAL at 13:45

## 2025-06-19 NOTE — CHART NOTE - NSCHARTNOTEFT_GEN_A_CORE
Last four PHQ 2/9 Test Results  0: Not at all  1: Several days  2: More than half the days  3: Nearly every day       PHQ9 SCREENING FLOWSHEET 7/15/2019   Adult PHQ2 Score 0   Adult PHQ9 Score 0   Little interest or pleasure in activity 0   Feeling down, depressed or hopeless 0   Trouble falling or staying asleep or sleeping all the time 0   Feeling tired or having little energy 0   Poor appetite or overeating 0   Feeling bad about yourself or that you are a failure or have let yourself or family down 0   Trouble concentrating on things such as reading hte newspaper or watching TV 0   Moving or speaking slowly that other people have noticed or the opposite - being so fidgety or restless that you have been moving around a lot more than usual 0   Thoughts that you would be better off dead or of hurting yourself in some way 0        Psych consulted, will see the pt in AM 6/20  Elsy Bertrand NP pager [phone]

## 2025-06-19 NOTE — PROGRESS NOTE ADULT - PROBLEM SELECTOR PLAN 4
-reports mood disorder with current medication regimen of lexapro and trazadone which he feels is not effective, hence the alcohol intake  Sees a psychiatrist and therapist on routine basis  -current regimen: lexapro 20mg daily (recently increased) and trazadone 100mg qhs (has been taking 50mg)  -c/w lexapro 20mg and trazadone 50mg qhs   psych eval

## 2025-06-19 NOTE — PROGRESS NOTE ADULT - SUBJECTIVE AND OBJECTIVE BOX
[facility] Division of Hospital Medicine  Marquita Johnson MD  Pager [phone]    Patient is a 29y old  Male who presents with a chief complaint of Alcohol Withdrawal      SUBJECTIVE / OVERNIGHT EVENTS: pt seen this AM; reports mild HA, currently no nausea but if he ate feels like he would probably vomit; not shaky      MEDICATIONS  (STANDING):  cyanocobalamin 1000 MICROGram(s) Oral daily  escitalopram 20 milliGRAM(s) Oral daily  famotidine Injectable 20 milliGRAM(s) IV Push two times a day  folic acid 1 milliGRAM(s) Oral daily  lactated ringers. 1000 milliLiter(s) (60 mL/Hr) IV Continuous <Continuous>  multivitamin 1 Tablet(s) Oral daily  thiamine IVPB 500 milliGRAM(s) IV Intermittent three times a day  traZODone 50 milliGRAM(s) Oral at bedtime    MEDICATIONS  (PRN):  acetaminophen     Tablet .. 650 milliGRAM(s) Oral every 6 hours PRN Temp greater or equal to 38C (100.4F), Mild Pain (1 - 3)  LORazepam   Injectable 2 milliGRAM(s) IV Push every 1 hour PRN CIWA 8-14  LORazepam   Injectable 4 milliGRAM(s) IV Push every 1 hour PRN CIWA 15 or greater, or seizure  melatonin 3 milliGRAM(s) Oral at bedtime PRN Insomnia  ondansetron Injectable 4 milliGRAM(s) IV Push every 8 hours PRN Nausea and/or Vomiting      PHYSICAL EXAM:  Vital Signs Last 24 Hrs  T(F): 97.7 (19 Jun 2025 11:30), Max: 99.4 (18 Jun 2025 16:48)  HR: 78 (19 Jun 2025 11:30) (78 - 115)  BP: 139/86 (19 Jun 2025 11:30) (139/86 - 159/74)  RR: 17 (19 Jun 2025 11:30) (16 - 18)  SpO2: 99% (19 Jun 2025 11:30) (97% - 100%)    Parameters below as of 19 Jun 2025 08:47  Patient On (Oxygen Delivery Method): room air        CONSTITUTIONAL: NAD, appears comfortable  EYES: PERRLA; conjunctiva and sclera clear  ENMT: Moist oral mucosa; normal dentition  RESPIRATORY: Normal respiratory effort; grossly b/l AE  CARDIOVASCULAR: Regular rate and rhythm; No lower extremity edema  ABDOMEN: Nontender to palpation, normoactive bowel sounds  MUSCULOSKELETAL:  no clubbing or cyanosis of digits; no joint swelling or tenderness to palpation  PSYCH: A+O to person, place, and time; affect appropriate  NEUROLOGY: CN 2-12 are intact and symmetric; no gross sensory deficits   SKIN: No rashes; no palpable lesions    LABS:                        15.3   5.71  )-----------( 176      ( 19 Jun 2025 10:31 )             46.0     06-19    138  |  103  |  14  ----------------------------<  86  3.7   |  22  |  0.82    Ca    8.4      19 Jun 2025 06:35      TPro  6.7  /  Alb  3.8  /  TBili  1.2  /  DBili  x   /  AST  25  /  ALT  42[H]  /  AlkPhos  75  06-19

## 2025-06-19 NOTE — PROGRESS NOTE ADULT - ASSESSMENT
Mr. Oleary is a 28 y/o F with PMH of Anxiety/Depression (on lexapro/trazadone), Hx of daily alcohol use, Erythrocytosis (Hb 17-18 typically, s/p phlebotomy x2 in past, BMB in past as well - inconclusive) who presents from home with his father for acute intoxication. Pt reports drinking 1-2L of alcohol daily with last drink on 6/17 in AM prior to presentation and +mild epigastric pain and admitted for further management.

## 2025-06-19 NOTE — PROGRESS NOTE ADULT - PROBLEM SELECTOR PLAN 2
-reported with mild epigastric pain, bandlike as well. s/p IV pepcid x1 in Ed as well. Lipase elevated at 91. No prior hx of pancreatitis. LFTs wnl, neg jane's sign on exam  -ddx: gastritis vs mild pancreatitis.  -IVF   -PPI BID  -zofran prn for nausea

## 2025-06-19 NOTE — PATIENT PROFILE ADULT - DO YOU EVER NEED HELP READING HOSPITAL MATERIALS?
Subjective:      Patient ID: Radha Degroot is a 21 y.o. female.    Vitals:  vitals were not taken for this visit.     Chief Complaint: URI      Visit Type: TELE AUDIOVISUAL - This visit was conducted virtually based on  subjective information and limited objective exam    Present with the patient at the time of consultation: TELEMED PRESENT WITH PATIENT: None  Two patient identifiers used to verify patient- saying out date of birth and full name.       History reviewed. No pertinent past medical history.  History reviewed. No pertinent surgical history.  Review of patient's allergies indicates:   Allergen Reactions    Acetaminophen-pamabrom Nausea And Vomiting     Current Outpatient Medications on File Prior to Visit   Medication Sig Dispense Refill    fluconazole (DIFLUCAN) 150 MG Tab Take 1 tab by mouth on day 1; if symptoms persist after 72 hours, take 2nd tab (Patient not taking: Reported on 9/12/2024) 2 tablet 1    fluticasone propionate (FLONASE) 50 mcg/actuation nasal spray Use 2 puffs in each nostril q day. (Patient not taking: Reported on 9/12/2024) 16 g 12    levocetirizine (XYZAL) 5 MG tablet Take 1 tablet (5 mg total) by mouth every evening. 90 tablet 3    nystatin (MYCOSTATIN) cream Apply topically 2 (two) times daily. (Patient not taking: Reported on 9/12/2024) 30 g 3    propranoloL (INDERAL) 10 MG tablet Take 1 tablet (10 mg total) by mouth 3 (three) times daily. 90 tablet 2     No current facility-administered medications on file prior to visit.     No family history on file.        Ohs Peq Odvv Intake    11/4/2024 11:17 AM CST - Filed by Patient   What is your current physical address in the event of a medical emergency? [address]   Are you able to take your vital signs? No   Please attach any relevant images or files    Is your employer contracted with [facility]? No         20 yo female with c/o cough, congestion, runny nose for several days. She states she is not running a  fever. She has been taking nyquil and dayquil. She states cough with sputum production.         Constitution: Negative for fever and generalized weakness.   HENT:  Positive for congestion and postnasal drip. Negative for sore throat.    Cardiovascular:  Negative for sob on exertion.   Respiratory:  Positive for cough and sputum production. Negative for shortness of breath and wheezing.    Allergic/Immunologic: Positive for sneezing.   Neurological:  Negative for headaches.        Objective:   The physical exam was conducted virtually.  LOCATION OF PATIENT [place]  AAO x 3 ; no acute distress noted; appearance normal; mood and behavior normal; thought process normal  Head- normocephalic  Nose- appears normal, no discharge or erythema  Eyes- pupils appear normal in size, no drainage, no erythema  Ears- normal appearing; no discharge, no erythema  Mouth- appears normal  Oropharynx- no erythema, lesions  Lungs- breathing at a normal rate, no acute distress noted  Heart- no reports of tachycardia, palpitations, chest pain  Abdomen- non distended, non tender reported by patient  Skin- warm and dry, no erythema or edema noted by patient or visualized  Psych- as above; no si/hi      Assessment:     No diagnosis found.    Plan:       Patient Instructions   - Stay hydrated, drink plenty of water, and REST.  - OTC guaifenesin (plain Mucinex) for thick nasal/sinus congestion.    - OTC Robitussin DM or Mucinex DM for congestion with cough (guaifenesin + dextromethorphan).  - OTC Flonase or Nasonex for sinus congestion.   - OTC Simply Saline (e.g. Arm & Hammer) for irritated and raw nasal passages.  - OTC Vicks VapoCOOL spray and Cepacol throat lozenges for sore throat.  - OTC ibuprofen or acetaminophen alternating every 4-6 hours as needed for aches/pains/high fever.  - OTC Airborne or Emergen-C have ingredients like Zinc, Echinacea, and vitamin-C to help boost the immune system. Elderberry is also good for this.      Symptoms  of upper respiratory infection (URI) or any acute rhinosinusitis can be treated with the following medications available over the counter. Take these according to the package instructions with the permission of your primary care provider and/or specialist(s).      Many products contain multiple medications in one, so be sure to check the ingredient list for any over the counter medication to be sure you are not taking the same medication in multiple ways.      Aches, pain, and fever:   acetaminophen (Tylenol)  NSAIDs (Motrin, Advil)* (avoid these in kidney disease)     Congestion:   Netti Pot  Guaifenesin (Mucinex, Robitussin)  Phenylephrine  Pseudoephedrine (Sudafed)  Nasal steroid spray (Nasacort, Flonase, Rhinocort)  Nasal saline     Cough/mucus expectorant:  Guaifenesin (Robitussin)  Menthol ointment (Vicks) (topically)     Cough suppressant:  Dextromethoraphan (Delsym)     Coughing is a normal body mechanism for removing secretions from lungs.   We suggest you avoid cough suppressants unless your cough is continuous or causing a disruption in your every day activities or sleep/rest.   If you were given a prescription for a cough suppressant, take it at night or when you are at home because it will make you drowsy.     Sore throat:  Cough drops  Throat spray  Salt water gargles  Warm water, honey, and a capful of Apple Cider vinegar gargles     Adults may take 2 teaspoons of honey nightly to help with a cough. This can be mixed with a mug of warm water and the juice of half of a lemon.    Children over the age of 5 years can be given 1 teaspoon nightly for cough. Honey should never be given to children less than 1 year old.       Runny nose/sneezing/allergies:  Antihistamine  Nasal steroid  Nasal saline reduces inflammation and dryness.     Warm face compresses help with facial sinus pain/pressure.     If you DO NOT have hypertension (high blood pressure) or any history of palpitations, it is okay to take  "over-the-counter Sudafed, Mucinex-D, Allegra-D, Claritin-D, Zyrtec-D. These can be found be asking the pharmacy staff because they are kept behind the counter.      If you DO have hypertension (high blood pressure) or palpitations, only take medications with a red heart on the box indicating it is "heart" safe. For example, it is safe to take Coricidin HBP for relief of sinus symptoms. Vicks NyQuil High Blood Pressure Cold& Flu, Eladio's Dayquil HBP, Mucinex, Benadryl, Zyrtec, Claritin, or Allegra are all safe for patient's with high blood pressure.     Be sure to wash your hands often. Do not share drinks. Attempt to cough into the curve of your elbow or into your hands.      To minimize the chance of re-infection, change your toothbrush after 3-4 days on antibiotics.  Alternatively, buy a multi-pack of toothbrushes (they can be found inexpensively at the dollar store) and use one per day, discarding it at the end of the day and then start with a new "regular" toothbrush after that.     Please take all of your antibiotics if you were prescribed them. If for any reason you do not complete the course of antibiotics, please throw the remainder away. It's very important to complete your entire course of medication treatment. Often patients will discontinue antibiotics after just a few days when they begin to feel better. This OFTEN leads to a return of your illness at a later time which is then more difficult to treat.      Antibiotics may cause diarrhea. Please make sure you take your antibiotics with food. If the diarrhea becomes profuse, you may try over the counter Immodium AD or Pepto Bismol to help slow down the diarrhea.  Pepto Bismol can turn your stool black if taken for several doses.      Taking an over-the-counter probiotic while you are taking the antibiotics can help to reduce GI upset as well as reduce the chances for developing a yeast infection.  Be sure to take the probiotic at a different time from the " antibiotic, for example, if you take the antibiotic in the morning and at night then take your probiotic at lunch.  Be sure to eat with your antibiotic to help reduce nausea with taking it.     If your condition worsens or fails to improve, we recommend that you receive another evaluation at the emergency room immediately or contact your primary medical clinic to discuss your concern.     Thank you for choosing [facility] today.     PLEASE ONLY TAKE MEDICATIONS APPROVED BY YOUR PRIMARY CARE PROVIDER AND SPECIALISTS.     Please understand that you've received an Urgent Care treatment only and that you may be released before all your medical problems are known or treated. You, the patient, will arrange for follow up care as instructed. Follow up with your PCP/specialty clinic as directed in the next 1-2 weeks if not improved or as needed. If your condition significantly worsens, we recommend that you receive another evaluation at the emergency room.     If you were prescribed an antibiotic, please take all of the medication as directed.     If you smoke, please stop smoking.          Urgent Care  You must understand that you've received an Urgent Care treatment only and that you may be released before all your medical problems are known or treated. You, the patient, will arrange for follow up care as instructed.     Follow up with your PCP or specialty clinic as directed in the next 1-2 weeks if not improved or as needed.  If your condition worsens we recommend that you receive another evaluation at the emergency room immediately or contact your primary medical clinics after hours call service to discuss your concerns.     If you were prescribed a narcotic or controlled medication, do not drive or operate heavy equipment or machinery while taking these medications.  If you were prescribed an antibiotic, please take all of the medication as directed.     Please go to the ER for severe pain, fever, difficulty  breathing, high fever, altered mental status, or unable to hydrate by mouth, or acute changes to urinary/gastro-intestinal function, severe or worsening symptoms, acute neurological changes such as numbness/weakness/tingling to the extremities, changes to vision.      If you smoke or use other tobacco products, please stop smoking/using tobacco products. If you do not use tobacco products or smoke, please do not start.          Thank you for choosing [facility]!    Our goal in the [facility] is to always provide outstanding medical care. You may receive a survey by mail or e-mail in the next week regarding your experience today. We would greatly appreciate you completing and returning the survey. Your feedback provides us with a way to recognize our staff who provide very good care, and it helps us learn how to improve when your experience was below our aspiration of excellence.         We appreciate you trusting us with your medical care. We hope you feel better soon. We will be happy to take care of you for all of your future medical needs.    You must understand that you've received an Urgent Care treatment only and that you may be released before all your medical problems are known or treated. You, the patient, will arrange for follow up care as instructed.    Follow up with your PCP or specialty clinic as directed in the next 1-2 weeks if not improved or as needed.  You can call [phone] to schedule an appointment with the appropriate provider.    If your condition worsens we recommend that you receive another evaluation in person, with your primary care provider, urgent care or at the emergency room immediately or contact your primary medical clinics after hours call service to discuss your concerns.         There are no diagnoses linked to this encounter.                 no

## 2025-06-19 NOTE — PROGRESS NOTE ADULT - PROBLEM SELECTOR PLAN 1
-reports drinking 1-2L of alcohol daily, last drink on 6/17 prior to presentation to the hospitalization for intoxication. . Serum tox neg. NO prior hx of alcohol withdrawal admissions in hospitalized setting, no hx of seizures, s/p po librium 50mg x1 in ED as well  -CIWA protocol with symptom trigger IV prn prn ativan, if pt starts scroing on CIWA, would start standing ativan  -zofran prn for nausea  -IV thiamine high dose  -folic acid, mvi, b12, pepcid   -SBIRT eval     #Sinus tach  -likely in setting of above, had prior cardiac workup in Feb w/normal stress and echo for T wave changes on EKG. Reports no cp/sob/dyspnea on exertion either.   -tele monitoring

## 2025-06-20 ENCOUNTER — TRANSCRIPTION ENCOUNTER (OUTPATIENT)
Age: 30
End: 2025-06-20

## 2025-06-20 VITALS
RESPIRATION RATE: 18 BRPM | DIASTOLIC BLOOD PRESSURE: 86 MMHG | OXYGEN SATURATION: 99 % | HEART RATE: 96 BPM | SYSTOLIC BLOOD PRESSURE: 142 MMHG | TEMPERATURE: 98 F

## 2025-06-20 DIAGNOSIS — F10.10 ALCOHOL ABUSE, UNCOMPLICATED: ICD-10-CM

## 2025-06-20 LAB
ALBUMIN SERPL ELPH-MCNC: 4.1 G/DL — SIGNIFICANT CHANGE UP (ref 3.3–5)
ALP SERPL-CCNC: 83 U/L — SIGNIFICANT CHANGE UP (ref 40–120)
ALT FLD-CCNC: 44 U/L — HIGH (ref 4–41)
ANION GAP SERPL CALC-SCNC: 16 MMOL/L — HIGH (ref 7–14)
AST SERPL-CCNC: 35 U/L — SIGNIFICANT CHANGE UP (ref 4–40)
BILIRUB SERPL-MCNC: 1.2 MG/DL — SIGNIFICANT CHANGE UP (ref 0.2–1.2)
BUN SERPL-MCNC: 9 MG/DL — SIGNIFICANT CHANGE UP (ref 7–23)
CALCIUM SERPL-MCNC: 9.2 MG/DL — SIGNIFICANT CHANGE UP (ref 8.4–10.5)
CHLORIDE SERPL-SCNC: 100 MMOL/L — SIGNIFICANT CHANGE UP (ref 98–107)
CO2 SERPL-SCNC: 20 MMOL/L — LOW (ref 22–31)
CREAT SERPL-MCNC: 0.81 MG/DL — SIGNIFICANT CHANGE UP (ref 0.5–1.3)
EGFR: 122 ML/MIN/1.73M2 — SIGNIFICANT CHANGE UP
EGFR: 122 ML/MIN/1.73M2 — SIGNIFICANT CHANGE UP
GLUCOSE SERPL-MCNC: 74 MG/DL — SIGNIFICANT CHANGE UP (ref 70–99)
HCT VFR BLD CALC: 47.7 % — SIGNIFICANT CHANGE UP (ref 39–50)
HGB BLD-MCNC: 15.9 G/DL — SIGNIFICANT CHANGE UP (ref 13–17)
MAGNESIUM SERPL-MCNC: 1.9 MG/DL — SIGNIFICANT CHANGE UP (ref 1.6–2.6)
MCHC RBC-ENTMCNC: 27.4 PG — SIGNIFICANT CHANGE UP (ref 27–34)
MCHC RBC-ENTMCNC: 33.3 G/DL — SIGNIFICANT CHANGE UP (ref 32–36)
MCV RBC AUTO: 82.1 FL — SIGNIFICANT CHANGE UP (ref 80–100)
NRBC # BLD AUTO: 0 K/UL — SIGNIFICANT CHANGE UP (ref 0–0)
NRBC # FLD: 0 K/UL — SIGNIFICANT CHANGE UP (ref 0–0)
NRBC BLD AUTO-RTO: 0 /100 WBCS — SIGNIFICANT CHANGE UP (ref 0–0)
PHOSPHATE SERPL-MCNC: 3.6 MG/DL — SIGNIFICANT CHANGE UP (ref 2.5–4.5)
PLATELET # BLD AUTO: 186 K/UL — SIGNIFICANT CHANGE UP (ref 150–400)
PMV BLD: 10.7 FL — SIGNIFICANT CHANGE UP (ref 7–13)
POTASSIUM SERPL-MCNC: 3.9 MMOL/L — SIGNIFICANT CHANGE UP (ref 3.5–5.3)
POTASSIUM SERPL-SCNC: 3.9 MMOL/L — SIGNIFICANT CHANGE UP (ref 3.5–5.3)
PROT SERPL-MCNC: 7.4 G/DL — SIGNIFICANT CHANGE UP (ref 6–8.3)
RBC # BLD: 5.81 M/UL — HIGH (ref 4.2–5.8)
RBC # FLD: 12.9 % — SIGNIFICANT CHANGE UP (ref 10.3–14.5)
SODIUM SERPL-SCNC: 136 MMOL/L — SIGNIFICANT CHANGE UP (ref 135–145)
WBC # BLD: 6.65 K/UL — SIGNIFICANT CHANGE UP (ref 3.8–10.5)
WBC # FLD AUTO: 6.65 K/UL — SIGNIFICANT CHANGE UP (ref 3.8–10.5)

## 2025-06-20 PROCEDURE — 99239 HOSP IP/OBS DSCHRG MGMT >30: CPT

## 2025-06-20 PROCEDURE — 90792 PSYCH DIAG EVAL W/MED SRVCS: CPT

## 2025-06-20 RX ORDER — SEMAGLUTIDE 1 MG/.5ML
2.4 INJECTION, SOLUTION SUBCUTANEOUS
Refills: 0 | DISCHARGE

## 2025-06-20 RX ORDER — MELATONIN 5 MG
1 TABLET ORAL
Qty: 0 | Refills: 0 | DISCHARGE
Start: 2025-06-20

## 2025-06-20 RX ORDER — B1/B2/B3/B5/B6/B12/VIT C/FOLIC 500-0.5 MG
1 TABLET ORAL
Qty: 0 | Refills: 0 | DISCHARGE
Start: 2025-06-20

## 2025-06-20 RX ADMIN — Medication 20 MILLIGRAM(S): at 17:25

## 2025-06-20 RX ADMIN — Medication 1 TABLET(S): at 13:55

## 2025-06-20 RX ADMIN — Medication 20 MILLIGRAM(S): at 05:19

## 2025-06-20 RX ADMIN — CYANOCOBALAMIN 1000 MICROGRAM(S): 1000 INJECTION INTRAMUSCULAR; SUBCUTANEOUS at 13:55

## 2025-06-20 RX ADMIN — Medication 105 MILLIGRAM(S): at 14:04

## 2025-06-20 RX ADMIN — ESCITALOPRAM OXALATE 20 MILLIGRAM(S): 20 TABLET ORAL at 13:55

## 2025-06-20 RX ADMIN — FOLIC ACID 1 MILLIGRAM(S): 1 TABLET ORAL at 13:55

## 2025-06-20 RX ADMIN — Medication 105 MILLIGRAM(S): at 05:20

## 2025-06-20 RX ADMIN — Medication 650 MILLIGRAM(S): at 13:55

## 2025-06-20 RX ADMIN — Medication 650 MILLIGRAM(S): at 14:25
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
## 2025-06-20 NOTE — DISCHARGE NOTE NURSING/CASE MANAGEMENT/SOCIAL WORK - NSSBIRTALCACTIVEREFTXDET_GEN_A_CORE
Screening results were reviewed with the patient. Patient was provided educational materials on low-risk guidelines and substance use and health. Motivation and goals were discussed. Referral to treatment for patient at [facility] is in progress.   Patient enrolled in [employer].

## 2025-06-20 NOTE — BH CONSULTATION LIAISON ASSESSMENT NOTE - NSBHREFERDETAILS_PSY_A_CORE_FT
Alcohol use disorder, anxiety, depression Alcohol use disorder, med management for anxiety/depression

## 2025-06-20 NOTE — DISCHARGE NOTE NURSING/CASE MANAGEMENT/SOCIAL WORK - PATIENT PORTAL LINK FT
You can access the FollowMyHealth Patient Portal offered by [facility] by registering at the following website: [email or web address]. By joining [facility]’s FollowMyHealth portal, you will also be able to view your health information using other applications (apps) compatible with our system.
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
## 2025-06-20 NOTE — DISCHARGE NOTE PROVIDER - NSDCMRMEDTOKEN_GEN_ALL_CORE_FT
cyanocobalamin 1000 mcg oral tablet: 1 tab(s) orally once a day  folic acid: 1 milligram(s) orally once a day  Lexapro 20 mg oral tablet: 1 tab(s) orally once a day  traZODone 50 mg oral tablet: 1 tab(s) orally once a day  VITAMIN D2 1.25MG(50,000 UNIT): TAKE 1 CAPSULE (50 THOUSAND UNITS) BY MOUTH WEEKLY  Wegovy (2.4 mg dose) subcutaneous solution: 2.4 milligram(s) subcutaneously once a week   cyanocobalamin 1000 mcg oral tablet: 1 tab(s) orally once a day  folic acid: 1 milligram(s) orally once a day  Lexapro 20 mg oral tablet: 1 tab(s) orally once a day  melatonin 3 mg oral tablet: 1 tab(s) orally once a day (at bedtime) As needed Insomnia  traZODone 50 mg oral tablet: 1 tab(s) orally once a day  VITAMIN D2 1.25MG(50,000 UNIT): TAKE 1 CAPSULE (50 THOUSAND UNITS) BY MOUTH WEEKLY  Wegovy (2.4 mg dose) subcutaneous solution: 2.4 milligram(s) subcutaneously once a week   cyanocobalamin 1000 mcg oral tablet: 1 tab(s) orally once a day  famotidine: 20 milligram(s) 2 times a day  folic acid: 1 milligram(s) orally once a day  Lexapro 20 mg oral tablet: 1 tab(s) orally once a day  melatonin 3 mg oral tablet: 1 tab(s) orally once a day (at bedtime) As needed Insomnia  Multiple Vitamins oral tablet: 1 tab(s) orally once a day  thiamine 100 mg oral tablet: 1 tab(s) orally once a day  traZODone 50 mg oral tablet: 1 tab(s) orally once a day  VITAMIN D2 1.25MG(50,000 UNIT): TAKE 1 CAPSULE (50 THOUSAND UNITS) BY MOUTH WEEKLY

## 2025-06-20 NOTE — DISCHARGE NOTE PROVIDER - NSDCCPCAREPLAN_GEN_ALL_CORE_FT
PRINCIPAL DISCHARGE DIAGNOSIS  Diagnosis: Alcohol dependence with withdrawal  Assessment and Plan of Treatment: You did not have many significant symptoms of alcohol withdrawal.  Please avoid alcohol and follow up with the outpatient program you were given information about.      SECONDARY DISCHARGE DIAGNOSES  Diagnosis: Depression with anxiety  Assessment and Plan of Treatment: Take your medication as prescribed; follow up with your psychiatrist     PRINCIPAL DISCHARGE DIAGNOSIS  Diagnosis: Alcohol dependence with withdrawal  Assessment and Plan of Treatment: You did not have many significant symptoms of alcohol withdrawal.  Please avoid alcohol and follow up with the outpatient program you were given information about.  COntinue Multivitamins, Thiamine, Folic acid      SECONDARY DISCHARGE DIAGNOSES  Diagnosis: Epigastric pain  Assessment and Plan of Treatment: avoid alcohol, can take Pepcid 20 mg 2 x day    Diagnosis: Depression with anxiety  Assessment and Plan of Treatment: Continue lexapro and trazadone as prescribed, follow up with your psychiatrist

## 2025-06-20 NOTE — BH CONSULTATION LIAISON ASSESSMENT NOTE - RISK ASSESSMENT
Pt is at low risk of acute harm to self or others.     Risk factors: Pt has Psych Hx of anxiety and depression. Pt has had passive SI in the past. Pt drinks 1L of alcohol daily. Pt went through recent breakup. Pt lives with family who he doesn't feel comfortable discussing mental health with.     Protective factors: Pt denies any previous or current active SI. Pt denies hx of self-injurious behavior. Pt has supportive friends, good therapeutic relationships with his outpt psychiatrist and therapist. Pt is currently employed but on a leave of absence. Pt has hopes and goals for the future. Pt has no access to firearms. Pt has good insight into the consequences of his alcohol use and is motivated to change.  Pt is at low risk of acute harm to self or others.     Risk factors: Pt has Psych Hx of anxiety and depression. Pt has had vague passive SI in the past. Pt drinks 1L of alcohol daily. Pt went through recent breakup. Pt lives with family who he doesn't feel comfortable discussing mental health with.     Protective factors: Pt denies any previous or current active SI. Pt denies hx of self-injurious behavior. Pt has supportive friends, good therapeutic relationships with his outpt psychiatrist and therapist. Pt is currently employed but on a leave of absence. Pt has hopes and goals for the future. Pt has no access to firearms. Pt has good insight into the consequences of his alcohol use and is motivated to change.

## 2025-06-20 NOTE — BH CONSULTATION LIAISON ASSESSMENT NOTE - NSBHCONSULTFOLLOWAFTERCARE_PSY_A_CORE FT
Follow up with outpt psychiatrist for medication management and alcohol use treatment. Outpt referral for alcohol use rehabilitation.  Follow up with outpt psychiatrist for medication management and alcohol use treatment. Appreciate SBIRT consult with outpt referral for alcohol use.  Follow up with thrapist and  outpt psychiatrist for medication management and alcohol use treatment. Appreciate SBIRT consult with outpt referral for alcohol use.    Additional referrals:  [facility] Outpatient services  For acute crisis: [facility]  [address] [facility], [address], [place] [address]  [phone]  [email or web address]    [facility]stance Abuse Outpatient Program ([facility]): [phone]      [facility]: [phone]

## 2025-06-20 NOTE — BH CONSULTATION LIAISON ASSESSMENT NOTE - CURRENT MEDICATION
MEDICATIONS  (STANDING):  cyanocobalamin 1000 MICROGram(s) Oral daily  escitalopram 20 milliGRAM(s) Oral daily  famotidine Injectable 20 milliGRAM(s) IV Push two times a day  folic acid 1 milliGRAM(s) Oral daily  lactated ringers. 1000 milliLiter(s) (60 mL/Hr) IV Continuous <Continuous>  multivitamin 1 Tablet(s) Oral daily  thiamine IVPB 500 milliGRAM(s) IV Intermittent three times a day  traZODone 50 milliGRAM(s) Oral at bedtime    MEDICATIONS  (PRN):  acetaminophen     Tablet .. 650 milliGRAM(s) Oral every 6 hours PRN Temp greater or equal to 38C (100.4F), Mild Pain (1 - 3)  LORazepam     Tablet 2 milliGRAM(s) Oral every 2 hours PRN Ciwa scare 8-14  melatonin 3 milliGRAM(s) Oral at bedtime PRN Insomnia  ondansetron Injectable 4 milliGRAM(s) IV Push every 8 hours PRN Nausea and/or Vomiting

## 2025-06-20 NOTE — DISCHARGE NOTE NURSING/CASE MANAGEMENT/SOCIAL WORK - FINANCIAL ASSISTANCE
[facility] provides services at a reduced cost to those who are determined to be eligible through [facility]’s financial assistance program. Information regarding [facility]’s financial assistance program can be found by going to [email or web address] or by calling [phone].

## 2025-06-20 NOTE — BH CONSULTATION LIAISON ASSESSMENT NOTE - NSBHATTESTCOMMENTATTENDFT_PSY_A_CORE
Chart reviewed, seen/evaluated with trainees, agree with above assessment/recs. Patient oriented/well engaged and euthymic with linear/coherent thought process, reports feeling anxious and depressed in the setting of some psychosocial stressors, denies current passive or active SIIP, denies HIIP, no evidence of psychosis, engaged in motivational interview, psychoeducation provided re; substance use, pt. receptive. Overall help seeking/future oriented/hopeful. Plan as above. Call with questions.

## 2025-06-20 NOTE — BH CONSULTATION LIAISON ASSESSMENT NOTE - NSBHCHARTREVIEWVS_PSY_A_CORE FT
Vital Signs Last 24 Hrs  T(C): 36.4 (20 Jun 2025 09:00), Max: 36.7 (19 Jun 2025 15:30)  T(F): 97.5 (20 Jun 2025 09:00), Max: 98.1 (19 Jun 2025 21:00)  HR: 86 (20 Jun 2025 09:00) (78 - 86)  BP: 129/82 (20 Jun 2025 09:00) (129/82 - 146/87)  BP(mean): --  RR: 18 (20 Jun 2025 09:00) (17 - 18)  SpO2: 97% (20 Jun 2025 09:00) (97% - 100%)    Parameters below as of 20 Jun 2025 09:00  Patient On (Oxygen Delivery Method): room air

## 2025-06-20 NOTE — DISCHARGE NOTE NURSING/CASE MANAGEMENT/SOCIAL WORK - NSDCPETBCESMAN_GEN_ALL_CORE
If you are a smoker, it is important for your health to stop smoking. Please be aware that second hand smoke is also harmful.
children/significant other

## 2025-06-20 NOTE — DISCHARGE NOTE PROVIDER - ATTENDING DISCHARGE PHYSICAL EXAMINATION:
pt seen and examined by me  feeling well  sina PO  VSS  CHEST non labored  ABD soft, NT  a/w EtOH w/d  CIWA scores 0-1, did not trigger any PRNs  medically stable for dc  suggested to maintain sobriety and avoid alcohol  asked his plans to maintain sobriety, "[facility]" however pt states he was also given number for a program which he plans to call

## 2025-06-20 NOTE — BH CONSULTATION LIAISON ASSESSMENT NOTE - NSBHCHARTREVIEWLAB_PSY_A_CORE FT
15.9   6.65  )-----------( 186      ( 20 Jun 2025 05:20 )             47.7   06-20    136  |  100  |  9   ----------------------------<  74  3.9   |  20[L]  |  0.81    Ca    9.2      20 Jun 2025 05:20  Phos  3.6     06-20  Mg     1.90     06-20    TPro  7.4  /  Alb  4.1  /  TBili  1.2  /  DBili  x   /  AST  35  /  ALT  44[H]  /  AlkPhos  83  06-20
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
## 2025-06-20 NOTE — BH CONSULTATION LIAISON ASSESSMENT NOTE - PATIENT'S CHIEF COMPLAINT
"I don't want to be dependent on alcohol anymore, but I feel like my medications are not helping my anxiety"

## 2025-06-20 NOTE — BH CONSULTATION LIAISON ASSESSMENT NOTE - NSACTIVEVENT_PSY_ALL_CORE
Triggering events leading to humiliation, shame, and/or despair (e.g., Loss of relationship, financial or health status) (real or anticipated) Triggering events leading to humiliation, shame, and/or despair (e.g., Loss of relationship, financial or health status) (real or anticipated)/Substance intoxication or withdrawal

## 2025-06-20 NOTE — BH CONSULTATION LIAISON ASSESSMENT NOTE - NSSUICRSKFACTOR_PSY_ALL_CORE
Current and Past Psychiatric Diagnoses/Presenting Symptoms Current and Past Psychiatric Diagnoses/Treatment Related Factors/Activating Events/Stressors